# Patient Record
Sex: FEMALE | Race: WHITE | NOT HISPANIC OR LATINO
[De-identification: names, ages, dates, MRNs, and addresses within clinical notes are randomized per-mention and may not be internally consistent; named-entity substitution may affect disease eponyms.]

---

## 2018-02-15 ENCOUNTER — APPOINTMENT (OUTPATIENT)
Dept: SURGERY | Facility: CLINIC | Age: 78
End: 2018-02-15
Payer: MEDICARE

## 2018-02-15 PROCEDURE — 99213 OFFICE O/P EST LOW 20 MIN: CPT

## 2018-08-23 ENCOUNTER — APPOINTMENT (OUTPATIENT)
Dept: SURGERY | Facility: CLINIC | Age: 78
End: 2018-08-23

## 2019-06-17 ENCOUNTER — OUTPATIENT (OUTPATIENT)
Dept: OUTPATIENT SERVICES | Facility: HOSPITAL | Age: 79
LOS: 1 days | End: 2019-06-17
Payer: MEDICARE

## 2019-06-17 ENCOUNTER — APPOINTMENT (OUTPATIENT)
Dept: ULTRASOUND IMAGING | Facility: HOSPITAL | Age: 79
End: 2019-06-17
Payer: MEDICARE

## 2019-06-17 DIAGNOSIS — K08.409 PARTIAL LOSS OF TEETH, UNSPECIFIED CAUSE, UNSPECIFIED CLASS: Chronic | ICD-10-CM

## 2019-06-17 DIAGNOSIS — C73 MALIGNANT NEOPLASM OF THYROID GLAND: ICD-10-CM

## 2019-06-17 PROCEDURE — 76536 US EXAM OF HEAD AND NECK: CPT | Mod: 26

## 2019-06-17 PROCEDURE — 76536 US EXAM OF HEAD AND NECK: CPT

## 2021-05-22 ENCOUNTER — APPOINTMENT (OUTPATIENT)
Dept: RADIOLOGY | Facility: HOSPITAL | Age: 81
End: 2021-05-22

## 2021-06-05 ENCOUNTER — OUTPATIENT (OUTPATIENT)
Dept: OUTPATIENT SERVICES | Facility: HOSPITAL | Age: 81
LOS: 1 days | End: 2021-06-05
Payer: MEDICARE

## 2021-06-05 ENCOUNTER — APPOINTMENT (OUTPATIENT)
Dept: RADIOLOGY | Facility: HOSPITAL | Age: 81
End: 2021-06-05
Payer: MEDICARE

## 2021-06-05 DIAGNOSIS — K08.409 PARTIAL LOSS OF TEETH, UNSPECIFIED CAUSE, UNSPECIFIED CLASS: Chronic | ICD-10-CM

## 2021-06-05 DIAGNOSIS — Z00.8 ENCOUNTER FOR OTHER GENERAL EXAMINATION: ICD-10-CM

## 2021-06-05 PROCEDURE — 77080 DXA BONE DENSITY AXIAL: CPT | Mod: 26

## 2021-06-05 PROCEDURE — 77080 DXA BONE DENSITY AXIAL: CPT

## 2022-05-03 ENCOUNTER — APPOINTMENT (OUTPATIENT)
Dept: SURGERY | Facility: CLINIC | Age: 82
End: 2022-05-03
Payer: MEDICARE

## 2022-05-03 VITALS
SYSTOLIC BLOOD PRESSURE: 146 MMHG | DIASTOLIC BLOOD PRESSURE: 78 MMHG | BODY MASS INDEX: 24.48 KG/M2 | HEART RATE: 60 BPM | WEIGHT: 133 LBS | HEIGHT: 62 IN

## 2022-05-03 DIAGNOSIS — Z78.9 OTHER SPECIFIED HEALTH STATUS: ICD-10-CM

## 2022-05-03 PROCEDURE — 99204 OFFICE O/P NEW MOD 45 MIN: CPT

## 2022-05-06 PROBLEM — Z78.9 NO HISTORY OF ALCOHOL USE: Status: ACTIVE | Noted: 2022-05-06

## 2022-05-06 PROBLEM — Z78.9 NEVER SMOKED TOBACCO: Status: ACTIVE | Noted: 2022-05-06

## 2022-05-06 NOTE — CONSULT LETTER
[Dear  ___] : Dear  [unfilled], [Consult Letter:] : I had the pleasure of evaluating your patient, [unfilled]. [Please see my note below.] : Please see my note below. [Consult Closing:] : Thank you very much for allowing me to participate in the care of this patient.  If you have any questions, please do not hesitate to contact me. [Sincerely,] : Sincerely, [FreeTextEntry2] : Dr. Chuckie Blair, Dr. Jamison Horton [FreeTextEntry3] : Giovanny Martini MD, FACS\par System Director, Endocrine Surgery\par [facility]\par Associate  Professor of Surgery\par [facility] [employer] [facility]  [DrSaad  ___] : Dr. WHITAKER

## 2022-05-06 NOTE — PHYSICAL EXAM
[de-identified] : well healed scar [de-identified] : no palpable thyroid nodules [Nasal Endoscopy Performed] : nasal endoscopy was performed, see procedure section for findings [Laryngoscopy Performed] : laryngoscopy was performed, see procedure section for findings [Midline] : located in midline position [Normal] : orientation to person, place, and time: normal [de-identified] : fiberoptic laryngoscopy shows normal vocal cord mobility bilaterally with no lesions noted

## 2022-05-06 NOTE — HISTORY OF PRESENT ILLNESS
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 c/o nodule found on sonogram.  notes occasional hoarseness.   denies dysphagia, SOB or RT exposure\par sonogram:  left thyroid bed 7 mm nodule\par TSH 1.69,   TG 4\par I have reviewed all old and new data and available images.

## 2022-05-06 NOTE — ASSESSMENT
[FreeTextEntry1] : initial cancer was isthmus .  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  will observe.   no indication for any biopsies at this time.  repeat sonogram next visit. to return earlier if any change. patient has been given the opportunity to ask questions, and all of the patient's questions have been answered to their satisfaction\par

## 2022-05-06 NOTE — REASON FOR VISIT
[Initial Consultation] : an initial consultation for [FreeTextEntry2] : Thyroid malignancy [Other: _____] : [unfilled]

## 2022-11-03 ENCOUNTER — APPOINTMENT (OUTPATIENT)
Dept: SURGERY | Facility: CLINIC | Age: 82
End: 2022-11-03

## 2023-01-04 ENCOUNTER — NON-APPOINTMENT (OUTPATIENT)
Age: 83
End: 2023-01-04

## 2023-01-27 ENCOUNTER — NON-APPOINTMENT (OUTPATIENT)
Age: 83
End: 2023-01-27

## 2023-10-17 ENCOUNTER — APPOINTMENT (OUTPATIENT)
Dept: OTOLARYNGOLOGY | Facility: CLINIC | Age: 83
End: 2023-10-17
Payer: MEDICARE

## 2023-10-17 VITALS
HEART RATE: 70 BPM | BODY MASS INDEX: 24.11 KG/M2 | TEMPERATURE: 98 F | DIASTOLIC BLOOD PRESSURE: 70 MMHG | HEIGHT: 62 IN | WEIGHT: 131 LBS | SYSTOLIC BLOOD PRESSURE: 133 MMHG

## 2023-10-17 DIAGNOSIS — J34.2 DEVIATED NASAL SEPTUM: ICD-10-CM

## 2023-10-17 DIAGNOSIS — J34.3 HYPERTROPHY OF NASAL TURBINATES: ICD-10-CM

## 2023-10-17 DIAGNOSIS — H93.8X3 OTHER SPECIFIED DISORDERS OF EAR, BILATERAL: ICD-10-CM

## 2023-10-17 DIAGNOSIS — H61.21 IMPACTED CERUMEN, RIGHT EAR: ICD-10-CM

## 2023-10-17 DIAGNOSIS — J30.0 VASOMOTOR RHINITIS: ICD-10-CM

## 2023-10-17 PROCEDURE — 92557 COMPREHENSIVE HEARING TEST: CPT

## 2023-10-17 PROCEDURE — 92567 TYMPANOMETRY: CPT

## 2023-10-17 PROCEDURE — 99203 OFFICE O/P NEW LOW 30 MIN: CPT | Mod: 25

## 2023-10-19 ENCOUNTER — OUTPATIENT (OUTPATIENT)
Dept: OUTPATIENT SERVICES | Facility: HOSPITAL | Age: 83
LOS: 1 days | End: 2023-10-19
Payer: MEDICARE

## 2023-10-19 ENCOUNTER — APPOINTMENT (OUTPATIENT)
Dept: ULTRASOUND IMAGING | Facility: HOSPITAL | Age: 83
End: 2023-10-19
Payer: MEDICARE

## 2023-10-19 DIAGNOSIS — R11.0 NAUSEA: ICD-10-CM

## 2023-10-19 DIAGNOSIS — K21.9 GASTRO-ESOPHAGEAL REFLUX DISEASE WITHOUT ESOPHAGITIS: ICD-10-CM

## 2023-10-19 DIAGNOSIS — K08.409 PARTIAL LOSS OF TEETH, UNSPECIFIED CAUSE, UNSPECIFIED CLASS: Chronic | ICD-10-CM

## 2023-10-19 PROCEDURE — 76700 US EXAM ABDOM COMPLETE: CPT | Mod: 26

## 2023-10-19 PROCEDURE — 76700 US EXAM ABDOM COMPLETE: CPT

## 2023-10-26 ENCOUNTER — APPOINTMENT (OUTPATIENT)
Dept: ENDOCRINOLOGY | Facility: CLINIC | Age: 83
End: 2023-10-26
Payer: MEDICARE

## 2023-10-26 VITALS
OXYGEN SATURATION: 97 % | BODY MASS INDEX: 24.11 KG/M2 | SYSTOLIC BLOOD PRESSURE: 132 MMHG | RESPIRATION RATE: 17 BRPM | HEART RATE: 73 BPM | WEIGHT: 131 LBS | HEIGHT: 62 IN | DIASTOLIC BLOOD PRESSURE: 74 MMHG | TEMPERATURE: 97.9 F

## 2023-10-26 DIAGNOSIS — M85.80 OTHER SPECIFIED DISORDERS OF BONE DENSITY AND STRUCTURE, UNSPECIFIED SITE: ICD-10-CM

## 2023-10-26 PROCEDURE — 99205 OFFICE O/P NEW HI 60 MIN: CPT

## 2023-11-13 ENCOUNTER — OUTPATIENT (OUTPATIENT)
Dept: OUTPATIENT SERVICES | Facility: HOSPITAL | Age: 83
LOS: 1 days | End: 2023-11-13
Payer: MEDICARE

## 2023-11-13 ENCOUNTER — APPOINTMENT (OUTPATIENT)
Dept: RADIOLOGY | Facility: HOSPITAL | Age: 83
End: 2023-11-13
Payer: MEDICARE

## 2023-11-13 DIAGNOSIS — K08.409 PARTIAL LOSS OF TEETH, UNSPECIFIED CAUSE, UNSPECIFIED CLASS: Chronic | ICD-10-CM

## 2023-11-13 DIAGNOSIS — M85.80 OTHER SPECIFIED DISORDERS OF BONE DENSITY AND STRUCTURE, UNSPECIFIED SITE: ICD-10-CM

## 2023-11-13 PROCEDURE — 77085 DXA BONE DENSITY AXL VRT FX: CPT

## 2023-11-13 PROCEDURE — 77085 DXA BONE DENSITY AXL VRT FX: CPT | Mod: 26

## 2023-11-14 LAB
25(OH)D3 SERPL-MCNC: 59.1 NG/ML
ALBUMIN SERPL ELPH-MCNC: 4.1 G/DL
ALP BLD-CCNC: 105 U/L
ALT SERPL-CCNC: 7 U/L
ANION GAP SERPL CALC-SCNC: 10 MMOL/L
AST SERPL-CCNC: 15 U/L
BILIRUB SERPL-MCNC: 0.3 MG/DL
BUN SERPL-MCNC: 24 MG/DL
CALCIUM SERPL-MCNC: 8.7 MG/DL
CALCIUM SERPL-MCNC: 8.7 MG/DL
CHLORIDE SERPL-SCNC: 103 MMOL/L
CO2 SERPL-SCNC: 26 MMOL/L
CREAT SERPL-MCNC: 0.91 MG/DL
EGFR: 62 ML/MIN/1.73M2
GLUCOSE SERPL-MCNC: 104 MG/DL
PARATHYROID HORMONE INTACT: 54 PG/ML
POTASSIUM SERPL-SCNC: 3.9 MMOL/L
PROT SERPL-MCNC: 6.8 G/DL
SODIUM SERPL-SCNC: 139 MMOL/L
TSH SERPL-ACNC: 0.63 UIU/ML

## 2023-11-27 LAB
THYROGLOB AB SERPL-ACNC: 50 IU/ML
THYROGLOB SERPL-MCNC: <2 NG/ML
THYROGLOB SERPL-MCNC: NORMAL NG/ML

## 2023-12-20 ENCOUNTER — APPOINTMENT (OUTPATIENT)
Dept: ENDOCRINOLOGY | Facility: CLINIC | Age: 83
End: 2023-12-20
Payer: MEDICARE

## 2023-12-20 VITALS
TEMPERATURE: 97.6 F | OXYGEN SATURATION: 99 % | HEIGHT: 62 IN | BODY MASS INDEX: 23.92 KG/M2 | DIASTOLIC BLOOD PRESSURE: 82 MMHG | WEIGHT: 130 LBS | RESPIRATION RATE: 16 BRPM | SYSTOLIC BLOOD PRESSURE: 128 MMHG | HEART RATE: 76 BPM

## 2023-12-20 PROCEDURE — 99215 OFFICE O/P EST HI 40 MIN: CPT

## 2023-12-20 NOTE — ASSESSMENT
[FreeTextEntry1] : Target TSH 0.1 to 0.5  This patient has Stage 1 papillary thyroid cancer - in 2015 she was a high risk of recurrence as the margins were very close to the circumferential margin however, her risk is now low to intermediate as she did not have a recurrence since 2015. She did not have radioactive iodine ablation post thyroidectomy.  I ordered a thyroid US and thyroglobulin panel.  She has a subcentimeter left thyroid bed lesion that has been stable since 2021 and her thyroglobulin antibodies vary from normal to elevated - THERE IS NO EVIDENCE THAT SHE HAS A RECURRENCE OF HER THYROID CANCER - her intermittently elevated thyroglobulin antibodies may be due to her Hashimoto's thyroiditis. TPO antibodies were also positive due to her Hashimoto's thyroiditis.  The patient has osteoporosis - she appears to have adequate intake of calcium from her diet and multivitamin.   Last TSH was mildly above goal but I will accept this level  Last TSH 11/13/2023 - 0.63 N Last 25 OH vitamin D - November 2023 > 30  Plan: 1. Continue levothyroxine 2. Labs to be done in 3 months - see below 3. Thyroid US - patient has an appointment for this in Jan 2024 4. Follow up in 3 months to review results.

## 2023-12-20 NOTE — PHYSICAL EXAM
[de-identified] : General: No distress, well nourished Eyes: Normal Sclera, EOMI, PERRL ENT: Normal appearance of the nose, normal oropharynx Neck/Thyroid: No cervical lymphadenopathy, no neck masses palpated Respiratory: No use of accessory muscles of respiration, vesicular breath sounds heard bilaterally, no crepitations or ronchi Cardiovascular: S1 and S2 heard and normal, no S3 or S4, no murmurs, radial pulse normal bilaterally Abdomen: soft, non-tender, no masses, normal bowel sounds Musculoskeletal: No swelling or deformities of joints of hands, no pedal edema Neurological: Normal range of motion in the hands, Normal brachioradialis reflexes bilaterally Psychiatry: Patient converses normally, good judgement and insight Skin: No rashes in hands, no nodules palpated in hands

## 2023-12-20 NOTE — HISTORY OF PRESENT ILLNESS
[FreeTextEntry1] : Problems: 1. Papillary thyroid carcinoma 2. Hypothyroidism 3. Hashimoto's thyroiditis 4. Osteoporosis  Papillary thyroid carcinoma/Hypothyroidism/Hashimoto's thyroiditis 1. Patient was diagnosed with papillary thyroid carcinoma in 2015 and she underwent total thyroidectomy with paratracheal lymph node dissection and became hypothyroid post operatively Patient was offered radioactive iodine therapy but patient did not have this done. 2. Surgical and Pathology report: Sept 2015 - Total thyroidectomy with paratracheal lymph node dissection - lymph nodes negative for metastatic carcinoma, chronic lymphocytic thyroiditis, papillary thyroid carcinoma (classical variant) in the isthmus with tumor size of 1.2 x 1 x 0.8 cm, margins very close to the circumferential margin (focally less than 0.1 mm), no capsule invasion, no lymphovascular invasion or extrathyroidal extension 3. Labs: June 2019 - TPO abs 36.6 (0-34.9), thyroglobulin < 0.2, Thyroglobulin abs 97.2 (0-40) 04/07/2022 - TSH 0.435 (low), thyroglobulin 4 N 06/28/2022 - TSH 0.26, thyroglobulin abs 39 N, Thyroglobulin < 0.2  11/13/2023 - TSH 0.63 N, Thyroglobulin <1, Antithyroglobulin antibodies 50 (>/=1) 4. Radiology: A. 2021 - US thyroid - there is a 0.7 x 0.4 x 0.5 cm left thyroid echogenicity B. 04/07/2023 - US neck - There is a 0.7 x 0.5 x 0.5 cm focus of echogenicity seen in the left thyroid bed.  5. Mother and two sister have hypothyroidism, no family history of thyroid cancer, no personal history of radiation therapy 6. Meds: Levothyroxine (generic) 75 micrograms po Monday to Saturday - fully complaint and patient advised on the appropriate use of levothyroxine.     Osteoporosis 1. Diagnosed in 2010s with osteopenia and diagnosed with osteoporosis in November 2023 2. Radiology: A. June 2021 - DXA scan - L1 to L4 BMD 1.224 with T score of positive 1.6, left femoral neck BMD 0.619 with T score of neg 2.1  B. 11/13/2023 - DXA scan - L1 to L4 - BMD 1.162 with T score positive 1, left femoral neck BMD of 0.566 with T score neg 2.5  3.  Labs: November 2023 - Cr N, Corrected calcium N, PTH N 4. Fractures - toe  5. Calcium and vitamin D intake Patient cannot tolerate calcium supplements. On multivitamins - calcium and vitamin D content unknown On vitamin D dose unknown Patient drinks 1/2 cup of almond milk per day, eats three slices of cheese per week, eats 24 OZ of yogurt per week, eats 24 OZ of ice-cream per week 6.  Meds: Patient used alendronate in the past for one month but she stopped this due to cough and GI side effects Patient was offered prolia in the past but she does not want to use this

## 2024-01-08 ENCOUNTER — OUTPATIENT (OUTPATIENT)
Dept: OUTPATIENT SERVICES | Facility: HOSPITAL | Age: 84
LOS: 1 days | End: 2024-01-08
Payer: MEDICARE

## 2024-01-08 ENCOUNTER — APPOINTMENT (OUTPATIENT)
Dept: ULTRASOUND IMAGING | Facility: HOSPITAL | Age: 84
End: 2024-01-08
Payer: MEDICARE

## 2024-01-08 DIAGNOSIS — C73 MALIGNANT NEOPLASM OF THYROID GLAND: ICD-10-CM

## 2024-01-08 DIAGNOSIS — K08.409 PARTIAL LOSS OF TEETH, UNSPECIFIED CAUSE, UNSPECIFIED CLASS: Chronic | ICD-10-CM

## 2024-01-08 PROCEDURE — 76536 US EXAM OF HEAD AND NECK: CPT

## 2024-01-08 PROCEDURE — 76536 US EXAM OF HEAD AND NECK: CPT | Mod: 26

## 2024-03-14 ENCOUNTER — NON-APPOINTMENT (OUTPATIENT)
Age: 84
End: 2024-03-14

## 2024-03-15 ENCOUNTER — OUTPATIENT (OUTPATIENT)
Dept: OUTPATIENT SERVICES | Facility: HOSPITAL | Age: 84
LOS: 1 days | End: 2024-03-15
Payer: MEDICARE

## 2024-03-15 ENCOUNTER — APPOINTMENT (OUTPATIENT)
Dept: RADIOLOGY | Facility: HOSPITAL | Age: 84
End: 2024-03-15

## 2024-03-15 DIAGNOSIS — M25.561 PAIN IN RIGHT KNEE: ICD-10-CM

## 2024-03-15 DIAGNOSIS — K08.409 PARTIAL LOSS OF TEETH, UNSPECIFIED CAUSE, UNSPECIFIED CLASS: Chronic | ICD-10-CM

## 2024-03-15 PROCEDURE — 73562 X-RAY EXAM OF KNEE 3: CPT | Mod: 26,RT

## 2024-03-15 PROCEDURE — 73562 X-RAY EXAM OF KNEE 3: CPT

## 2024-03-18 ENCOUNTER — APPOINTMENT (OUTPATIENT)
Dept: ORTHOPEDIC SURGERY | Facility: CLINIC | Age: 84
End: 2024-03-18
Payer: MEDICARE

## 2024-03-18 ENCOUNTER — APPOINTMENT (OUTPATIENT)
Dept: MRI IMAGING | Facility: HOSPITAL | Age: 84
End: 2024-03-18
Payer: MEDICARE

## 2024-03-18 ENCOUNTER — TRANSCRIPTION ENCOUNTER (OUTPATIENT)
Age: 84
End: 2024-03-18

## 2024-03-18 ENCOUNTER — OUTPATIENT (OUTPATIENT)
Dept: OUTPATIENT SERVICES | Facility: HOSPITAL | Age: 84
LOS: 1 days | End: 2024-03-18
Payer: MEDICARE

## 2024-03-18 VITALS — BODY MASS INDEX: 24.35 KG/M2 | WEIGHT: 129 LBS | HEIGHT: 61 IN

## 2024-03-18 DIAGNOSIS — M17.11 UNILATERAL PRIMARY OSTEOARTHRITIS, RIGHT KNEE: ICD-10-CM

## 2024-03-18 PROCEDURE — 73721 MRI JNT OF LWR EXTRE W/O DYE: CPT

## 2024-03-18 PROCEDURE — 99204 OFFICE O/P NEW MOD 45 MIN: CPT

## 2024-03-18 PROCEDURE — 73721 MRI JNT OF LWR EXTRE W/O DYE: CPT | Mod: 26,RT

## 2024-03-21 ENCOUNTER — APPOINTMENT (OUTPATIENT)
Dept: ORTHOPEDIC SURGERY | Facility: CLINIC | Age: 84
End: 2024-03-21
Payer: MEDICARE

## 2024-03-21 VITALS — BODY MASS INDEX: 24.35 KG/M2 | HEIGHT: 61 IN | WEIGHT: 129 LBS

## 2024-03-21 PROCEDURE — 99214 OFFICE O/P EST MOD 30 MIN: CPT

## 2024-03-25 ENCOUNTER — APPOINTMENT (OUTPATIENT)
Dept: ENDOCRINOLOGY | Facility: CLINIC | Age: 84
End: 2024-03-25

## 2024-04-11 ENCOUNTER — APPOINTMENT (OUTPATIENT)
Dept: ORTHOPEDIC SURGERY | Facility: CLINIC | Age: 84
End: 2024-04-11
Payer: MEDICARE

## 2024-04-11 VITALS — HEIGHT: 61 IN | WEIGHT: 129 LBS | BODY MASS INDEX: 24.35 KG/M2

## 2024-04-11 DIAGNOSIS — M17.11 UNILATERAL PRIMARY OSTEOARTHRITIS, RIGHT KNEE: ICD-10-CM

## 2024-04-11 DIAGNOSIS — S82.141A DISPLACED BICONDYLAR FRACTURE OF RIGHT TIBIA, INITIAL ENCOUNTER FOR CLOSED FRACTURE: ICD-10-CM

## 2024-04-11 PROCEDURE — 20610 DRAIN/INJ JOINT/BURSA W/O US: CPT | Mod: RT

## 2024-04-11 PROCEDURE — 99214 OFFICE O/P EST MOD 30 MIN: CPT | Mod: 25

## 2024-04-11 PROCEDURE — 73564 X-RAY EXAM KNEE 4 OR MORE: CPT | Mod: RT

## 2024-04-12 ENCOUNTER — APPOINTMENT (OUTPATIENT)
Dept: MRI IMAGING | Facility: CLINIC | Age: 84
End: 2024-04-12
Payer: MEDICARE

## 2024-04-12 ENCOUNTER — OUTPATIENT (OUTPATIENT)
Dept: OUTPATIENT SERVICES | Facility: HOSPITAL | Age: 84
LOS: 1 days | End: 2024-04-12
Payer: MEDICARE

## 2024-04-12 DIAGNOSIS — M17.11 UNILATERAL PRIMARY OSTEOARTHRITIS, RIGHT KNEE: ICD-10-CM

## 2024-04-12 DIAGNOSIS — K08.409 PARTIAL LOSS OF TEETH, UNSPECIFIED CAUSE, UNSPECIFIED CLASS: Chronic | ICD-10-CM

## 2024-04-12 PROCEDURE — 73721 MRI JNT OF LWR EXTRE W/O DYE: CPT | Mod: 26,RT

## 2024-04-12 PROCEDURE — 73721 MRI JNT OF LWR EXTRE W/O DYE: CPT

## 2024-04-17 ENCOUNTER — NON-APPOINTMENT (OUTPATIENT)
Age: 84
End: 2024-04-17

## 2024-06-17 ENCOUNTER — APPOINTMENT (OUTPATIENT)
Dept: ENDOCRINOLOGY | Facility: CLINIC | Age: 84
End: 2024-06-17
Payer: MEDICARE

## 2024-06-17 VITALS
SYSTOLIC BLOOD PRESSURE: 126 MMHG | WEIGHT: 128 LBS | TEMPERATURE: 97.3 F | DIASTOLIC BLOOD PRESSURE: 62 MMHG | OXYGEN SATURATION: 99 % | HEART RATE: 76 BPM | BODY MASS INDEX: 24.17 KG/M2 | RESPIRATION RATE: 18 BRPM | HEIGHT: 61 IN

## 2024-06-17 DIAGNOSIS — C73 MALIGNANT NEOPLASM OF THYROID GLAND: ICD-10-CM

## 2024-06-17 DIAGNOSIS — E06.3 AUTOIMMUNE THYROIDITIS: ICD-10-CM

## 2024-06-17 DIAGNOSIS — M81.0 AGE-RELATED OSTEOPOROSIS W/OUT CURRENT PATHOLOGICAL FRACTURE: ICD-10-CM

## 2024-06-17 DIAGNOSIS — E03.9 HYPOTHYROIDISM, UNSPECIFIED: ICD-10-CM

## 2024-06-17 PROCEDURE — G2211 COMPLEX E/M VISIT ADD ON: CPT

## 2024-06-17 PROCEDURE — 99215 OFFICE O/P EST HI 40 MIN: CPT

## 2024-06-17 RX ORDER — RISEDRONATE SODIUM 35 MG/1
35 TABLET, FILM COATED ORAL
Qty: 3 | Refills: 3 | Status: ACTIVE | COMMUNITY
Start: 2024-06-17 | End: 1900-01-01

## 2024-06-17 NOTE — ASSESSMENT
[FreeTextEntry1] : Target TSH 0.1 to 0.5  This patient has Stage 1 papillary thyroid cancer - in 2015 she was a high risk of recurrence as the margins were very close to the circumferential margin however, her risk is now low to intermediate as she did not have a recurrence since 2015. She did not have radioactive iodine ablation post thyroidectomy.   She had a subcentimeter left thyroid bed lesion that has been stable from 2021 to 2023 but thyroid US  and her thyroglobulin antibodies vary from normal to elevated - THERE IS NO EVIDENCE THAT SHE HAS A RECURRENCE OF HER THYROID CANCER - her intermittently elevated thyroglobulin antibodies may be due to her Hashimoto's thyroiditis. TPO antibodies were also positive due to her Hashimoto's thyroiditis.  The patient has osteoporosis - she appears to have adequate intake of calcium from her diet and multivitamin. Next DXA scan due in Dec 2025. The patient consented to treatment for her osteoporosis on 06/17/2024 but she only wanted to use an oral medication so I prescribed actonel on 06/17/2024.   Last TSH was mildly above goal but I will accept this level  Last TSH 11/13/2023 - 0.63 N Last 25 OH vitamin D - November 2023 > 30 Last Thyroglobulin level - November 2023 - Antithyroglobulin antibodies 50 (<1), thyroglobulin ANJELICA < 2 N Last thyroid US - Jan 2024 - no recurrence Last 25 OH vitamin D - November 2023 - 59.1  Plan: 1. Continue levothyroxine 2. Start actonel 35 mg po once weekly 3. Labs to be done today - see below 4. Follow up in 6 weeks to review results.

## 2024-06-17 NOTE — HISTORY OF PRESENT ILLNESS
[FreeTextEntry1] : Problems: 1. Papillary thyroid carcinoma 2. Hypothyroidism 3. Hashimoto's thyroiditis 4. Osteoporosis  Papillary thyroid carcinoma/Hypothyroidism/Hashimoto's thyroiditis 1. Patient was diagnosed with papillary thyroid carcinoma in 2015 and she underwent total thyroidectomy with paratracheal lymph node dissection and became hypothyroid post operatively Patient was offered radioactive iodine therapy but patient did not have this done. 2. Surgical and Pathology report: Sept 2015 - Total thyroidectomy with paratracheal lymph node dissection - lymph nodes negative for metastatic carcinoma, chronic lymphocytic thyroiditis, papillary thyroid carcinoma (classical variant) in the isthmus with tumor size of 1.2 x 1 x 0.8 cm, margins very close to the circumferential margin (focally less than 0.1 mm), no capsule invasion, no lymphovascular invasion or extrathyroidal extension 3. Labs: June 2019 - TPO abs 36.6 (0-34.9), thyroglobulin < 0.2, Thyroglobulin abs 97.2 (0-40) 04/07/2022 - TSH 0.435 (low), thyroglobulin 4 N 06/28/2022 - TSH 0.26, thyroglobulin abs 39 N, Thyroglobulin < 0.2  11/13/2023 - TSH 0.63 N, Thyroglobulin <1, Antithyroglobulin antibodies 50 (>/=1) 4. Radiology: A. 2021 - US thyroid - there is a 0.7 x 0.4 x 0.5 cm left thyroid echogenicity B. 04/07/2023 - US neck - There is a 0.7 x 0.5 x 0.5 cm focus of echogenicity seen in the left thyroid bed.  C. Jan 2024 - US thyroid - Status post total thyroidectomy. No discrete solid or cystic lesion is noted within the thyroid surgical bed,  No enlarged or abnormal morphology cervical nodes. 5. Mother and two sister have hypothyroidism, no family history of thyroid cancer, no personal history of radiation therapy 6. Meds: Levothyroxine (generic) 75 micrograms po Monday to Saturday - fully complaint and patient advised on the appropriate use of levothyroxine.     Osteoporosis 1. Diagnosed in 2010s with osteopenia and diagnosed with osteoporosis in November 2023 2. Radiology: A. June 2021 - DXA scan - L1 to L4 BMD 1.224 with T score of positive 1.6, left femoral neck BMD 0.619 with T score of neg 2.1  B. 11/13/2023 - DXA scan - L1 to L4 - BMD 1.162 with T score positive 1, left femoral neck BMD of 0.566 with T score neg 2.5  3.  Labs: November 2023 - Cr N, Corrected calcium N, PTH N 4. Fractures - toe, right tibia in March 2024 when she tripped while walking  5. Calcium and vitamin D intake Patient cannot tolerate calcium supplements. On multivitamins which she uses intermittently - calcium and vitamin D content unknown On vitamin D dose 5000 units po daily Patient drinks 1/2 cup of almond milk per day, eats three slices of cheese per week, eats 24 OZ of yogurt per week, eats 24 OZ of ice-cream per week 6.  Meds: Patient used alendronate in the past for one month but she stopped this due to cough and GI side effects Patient was offered prolia in the past but she does not want to use this

## 2024-06-17 NOTE — PHYSICAL EXAM
[de-identified] : General: No distress, well nourished Eyes: Normal Sclera, EOMI, PERRL ENT: Normal appearance of the nose, normal oropharynx Neck/Thyroid: No cervical lymphadenopathy, no neck masses palpated Respiratory: No use of accessory muscles of respiration, vesicular breath sounds heard bilaterally, no crepitations or ronchi Cardiovascular: S1 and S2 heard and normal, no S3 or S4, no murmurs, radial pulse normal bilaterally Abdomen: soft, non-tender, no masses, normal bowel sounds Musculoskeletal: No swelling or deformities of joints of hands, no pedal edema Neurological: Normal range of motion in the hands, Normal brachioradialis reflexes bilaterally Psychiatry: Patient converses normally, good judgement and insight Skin: No rashes in hands, no nodules palpated in hands

## 2024-06-18 LAB
25(OH)D3 SERPL-MCNC: 74.3 NG/ML
ALBUMIN SERPL ELPH-MCNC: 4.3 G/DL
ALP BLD-CCNC: 95 U/L
ALT SERPL-CCNC: 5 U/L
ANION GAP SERPL CALC-SCNC: 16 MMOL/L
AST SERPL-CCNC: 19 U/L
BILIRUB SERPL-MCNC: 0.4 MG/DL
BUN SERPL-MCNC: 21 MG/DL
CALCIUM SERPL-MCNC: 9.5 MG/DL
CHLORIDE SERPL-SCNC: 99 MMOL/L
CO2 SERPL-SCNC: 23 MMOL/L
CREAT SERPL-MCNC: 0.94 MG/DL
EGFR: 60 ML/MIN/1.73M2
GLUCOSE SERPL-MCNC: 67 MG/DL
POTASSIUM SERPL-SCNC: 4.3 MMOL/L
PROT SERPL-MCNC: 7.8 G/DL
SODIUM SERPL-SCNC: 137 MMOL/L
THYROGLOB AB SERPL-ACNC: 25.4 IU/ML
THYROGLOB SERPL-MCNC: <0.2 NG/ML
TSH SERPL-ACNC: 0.78 UIU/ML

## 2024-06-23 LAB
CLINICAL BIOCHEMIST REVIEW: NORMAL
THYROGLOB SERPL-MCNC: <0.2 NG/ML

## 2024-07-11 ENCOUNTER — APPOINTMENT (OUTPATIENT)
Dept: ORTHOPEDIC SURGERY | Facility: CLINIC | Age: 84
End: 2024-07-11
Payer: MEDICARE

## 2024-07-11 VITALS — BODY MASS INDEX: 24.17 KG/M2 | WEIGHT: 128 LBS | HEIGHT: 61 IN

## 2024-07-11 DIAGNOSIS — S82.141D DISPLACED BICONDYLAR FRACTURE OF RIGHT TIBIA, SUBSEQUENT ENCOUNTER FOR CLOSED FRACTURE WITH ROUTINE HEALING: ICD-10-CM

## 2024-07-11 PROCEDURE — 99214 OFFICE O/P EST MOD 30 MIN: CPT

## 2024-07-11 PROCEDURE — 73564 X-RAY EXAM KNEE 4 OR MORE: CPT | Mod: 50

## 2024-07-22 ENCOUNTER — APPOINTMENT (OUTPATIENT)
Dept: ORTHOPEDIC SURGERY | Facility: CLINIC | Age: 84
End: 2024-07-22
Payer: MEDICARE

## 2024-07-22 VITALS — WEIGHT: 128 LBS | HEIGHT: 61 IN | BODY MASS INDEX: 24.17 KG/M2

## 2024-07-22 DIAGNOSIS — M17.11 UNILATERAL PRIMARY OSTEOARTHRITIS, RIGHT KNEE: ICD-10-CM

## 2024-07-22 DIAGNOSIS — M17.12 UNILATERAL PRIMARY OSTEOARTHRITIS, LEFT KNEE: ICD-10-CM

## 2024-07-22 PROCEDURE — 20610 DRAIN/INJ JOINT/BURSA W/O US: CPT | Mod: 50

## 2024-07-22 NOTE — PHYSICAL EXAM
[Wheelchair] : uses a wheelchair [Normal RLE] : Right Lower Extremity: No scars, rashes, lesions, ulcers, skin intact [Normal LLE] : Left Lower Extremity: No scars, rashes, lesions, ulcers, skin intact [Normal Touch] : sensation intact for touch [Normal] : Alert and in no acute distress

## 2024-07-26 ENCOUNTER — APPOINTMENT (OUTPATIENT)
Dept: OTOLARYNGOLOGY | Facility: CLINIC | Age: 84
End: 2024-07-26
Payer: MEDICARE

## 2024-07-26 VITALS
DIASTOLIC BLOOD PRESSURE: 75 MMHG | SYSTOLIC BLOOD PRESSURE: 132 MMHG | WEIGHT: 128 LBS | HEIGHT: 61 IN | BODY MASS INDEX: 24.17 KG/M2 | HEART RATE: 74 BPM

## 2024-07-26 PROCEDURE — 92567 TYMPANOMETRY: CPT

## 2024-07-26 PROCEDURE — 92557 COMPREHENSIVE HEARING TEST: CPT

## 2024-07-26 PROCEDURE — 99213 OFFICE O/P EST LOW 20 MIN: CPT

## 2024-07-26 RX ORDER — PREDNISONE 10 MG/1
10 TABLET ORAL
Qty: 95 | Refills: 0 | Status: ACTIVE | COMMUNITY
Start: 2024-07-26 | End: 1900-01-01

## 2024-07-26 NOTE — END OF VISIT
[FreeTextEntry3] : I personally saw and examined PRITI WU in detail.  I spoke to TARA Tobar regarding the assessment and plan of care. I performed the procedures and relevant physical exam.  I have reviewed the above assessment and plan of care and I agree.  I have made changes to the body of the note wherever necessary and appropriate.

## 2024-07-26 NOTE — DATA REVIEWED
[de-identified] : 10/18/23: b/l high freq SNHL  07/26/24 10 dB asymmetry in all frequencies in the left ear

## 2024-07-26 NOTE — PHYSICAL EXAM
[] : septum deviated to the left [Midline] : trachea located in midline position [Normal] : inferior turbinates and middle turbinates are normal

## 2024-07-26 NOTE — HISTORY OF PRESENT ILLNESS
[de-identified] : Ms. WU is a 83 year female with c/o pressure and clogging day after second covid vaccine more than 2 years ago which comes and goes, also with b/l tinnitus which comes and goes - denies sig ear history - has seen an ENT years ago [FreeTextEntry1] : Last seen 10/2023 Believes she had water in her left ear 10 days ago that is causing a clogged sensation that she cannot "pop" This is causing decreased hearing in left side Has had tinnitus for years Denies otalgia, otorrhea, dizziness, headaches related to hearing

## 2024-07-26 NOTE — ASSESSMENT
[FreeTextEntry1] : SSNHL left -Only 10 dB asymmetry, 10 days out -Discussed IT steroid injection versus p.o. steroids.  No history of diabetes, will start with p.o. steroids and see how she tolerates - Follow-up next week for repeat audio - MRI if asymmetry does not resolve -IT injection if does not tolerate p.o. steroids

## 2024-07-26 NOTE — REASON FOR VISIT
[Subsequent Evaluation] : a subsequent evaluation for [Tinnitus] : tinnitus [FreeTextEntry2] : ear clogging / pressure / rhinitis

## 2024-07-29 ENCOUNTER — APPOINTMENT (OUTPATIENT)
Dept: ENDOCRINOLOGY | Facility: CLINIC | Age: 84
End: 2024-07-29
Payer: MEDICARE

## 2024-07-29 VITALS
HEIGHT: 61 IN | WEIGHT: 130 LBS | BODY MASS INDEX: 24.55 KG/M2 | DIASTOLIC BLOOD PRESSURE: 78 MMHG | SYSTOLIC BLOOD PRESSURE: 118 MMHG | HEART RATE: 75 BPM | OXYGEN SATURATION: 98 % | RESPIRATION RATE: 18 BRPM | TEMPERATURE: 97.3 F

## 2024-07-29 DIAGNOSIS — E06.3 AUTOIMMUNE THYROIDITIS: ICD-10-CM

## 2024-07-29 DIAGNOSIS — C73 MALIGNANT NEOPLASM OF THYROID GLAND: ICD-10-CM

## 2024-07-29 DIAGNOSIS — M81.0 AGE-RELATED OSTEOPOROSIS W/OUT CURRENT PATHOLOGICAL FRACTURE: ICD-10-CM

## 2024-07-29 DIAGNOSIS — E03.9 HYPOTHYROIDISM, UNSPECIFIED: ICD-10-CM

## 2024-07-29 PROCEDURE — 99215 OFFICE O/P EST HI 40 MIN: CPT

## 2024-07-29 NOTE — ASSESSMENT
[FreeTextEntry1] : Target TSH 0.1 to 0.5  This patient has Stage 1 papillary thyroid cancer - in 2015 she was a high risk of recurrence as the margins were very close to the circumferential margin however, her risk is now low to intermediate as she did not have a recurrence since 2015. She did not have radioactive iodine ablation post thyroidectomy.   She had a subcentimeter left thyroid bed lesion that has been stable from 2021 to 2023 but thyroid US and her thyroglobulin antibodies vary from normal to elevated - THERE IS NO EVIDENCE THAT SHE HAS A RECURRENCE OF HER THYROID CANCER - her intermittently elevated thyroglobulin antibodies may be due to her Hashimoto's thyroiditis. TPO antibodies were also positive due to her Hashimoto's thyroiditis.  The patient has osteoporosis - she appears to have adequate intake of calcium from her diet and multivitamin. Next DXA scan due in Dec 2025. The patient does not want to use treatment for her osteoporosis - she is to perform weight bearing exercises to improve her BMD. HER 25 OH VITAMIN D WAS IN TH 70S IN JUNE 2024 SO I DECREASED THE DOSE OF VITAMIN D  Last TSH was mildly above goal but I will accept this level  Last TSH - June - 0.78 N Last Thyroglobulin level - June 2024 - TSH 0.78 N, thyroglobulin by mass spectrometry - <0.2, thyroglobulin < 0.2, thyroglobulin antibodies 25,4 (</=40), DUE NEXT IN FEB 2025 Last thyroid US - Jan 2024 - no recurrence DUE NEXT IN FEB 2025 Last 25 OH vitamin D - June 2024 - 74.3  Plan: 1. Continue levothyroxine 2. Decrease vitamin D to 1000 units po daily 3. Labs to be done in Feb 2025 - see below 4. Thyroid US to be done in Feb 2025 5. Follow up in Feb 2025 to review results.

## 2024-07-29 NOTE — PHYSICAL EXAM
[de-identified] : General: No distress, well nourished Eyes: Normal Sclera, EOMI, PERRL ENT: Normal appearance of the nose, normal oropharynx Neck/Thyroid: No cervical lymphadenopathy, no neck masses palpated Respiratory: No use of accessory muscles of respiration, vesicular breath sounds heard bilaterally, no crepitations or ronchi Cardiovascular: S1 and S2 heard and normal, no S3 or S4, no murmurs, radial pulse normal bilaterally Abdomen: soft, non-tender, no masses, normal bowel sounds Musculoskeletal: No swelling or deformities of joints of hands, no pedal edema Neurological: Normal range of motion in the hands, Normal brachioradialis reflexes bilaterally Psychiatry: Patient converses normally, good judgement and insight Skin: No rashes in hands, no nodules palpated in hands

## 2024-07-29 NOTE — HISTORY OF PRESENT ILLNESS
[FreeTextEntry1] : Problems: 1. Papillary thyroid carcinoma 2. Hypothyroidism 3. Hashimoto's thyroiditis 4. Osteoporosis  Papillary thyroid carcinoma/Hypothyroidism/Hashimoto's thyroiditis 1. Patient was diagnosed with papillary thyroid carcinoma in 2015 and she underwent total thyroidectomy with paratracheal lymph node dissection and became hypothyroid post operatively Patient was offered radioactive iodine therapy but patient did not have this done. 2. Surgical and Pathology report: Sept 2015 - Total thyroidectomy with paratracheal lymph node dissection - lymph nodes negative for metastatic carcinoma, chronic lymphocytic thyroiditis, papillary thyroid carcinoma (classical variant) in the isthmus with tumor size of 1.2 x 1 x 0.8 cm, margins very close to the circumferential margin (focally less than 0.1 mm), no capsule invasion, no lymphovascular invasion or extrathyroidal extension 3. Labs: June 2019 - TPO abs 36.6 (0-34.9), thyroglobulin < 0.2, Thyroglobulin abs 97.2 (0-40) 04/07/2022 - TSH 0.435 (low), thyroglobulin 4 N 06/28/2022 - TSH 0.26, thyroglobulin abs 39 N, Thyroglobulin < 0.2  11/13/2023 - TSH 0.63 N, Thyroglobulin <1, Antithyroglobulin antibodies 50 (>/=1) June 2024 - TSH 0.78 N, thyroglobulin by mass spectrometry - <0.2, thyroglobulin < 0.2, thyroglobulin antibodies 25,4 (</=40) 4. Radiology: A. 2021 - US thyroid - there is a 0.7 x 0.4 x 0.5 cm left thyroid echogenicity B. 04/07/2023 - US neck - There is a 0.7 x 0.5 x 0.5 cm focus of echogenicity seen in the left thyroid bed.  C. Jan 2024 - US thyroid - Status post total thyroidectomy. No discrete solid or cystic lesion is noted within the thyroid surgical bed,  No enlarged or abnormal morphology cervical nodes. 5. Mother and two sister have hypothyroidism, no family history of thyroid cancer, no personal history of radiation therapy 6. Meds: Levothyroxine (generic) 75 micrograms po Monday to Saturday - fully complaint and patient advised on the appropriate use of levothyroxine.     Osteoporosis 1. Diagnosed in 2010s with osteopenia and diagnosed with osteoporosis in November 2023 2. Radiology: A. June 2021 - DXA scan - L1 to L4 BMD 1.224 with T score of positive 1.6, left femoral neck BMD 0.619 with T score of neg 2.1  B. 11/13/2023 - DXA scan - L1 to L4 - BMD 1.162 with T score positive 1, left femoral neck BMD of 0.566 with T score neg 2.5  3.  Labs: November 2023 - Cr N, Corrected calcium N, PTH N 4. Fractures - toe, right tibia in March 2024 when she tripped while walking  5. Calcium and vitamin D intake Patient cannot tolerate calcium supplements. On multivitamins which she uses intermittently - calcium and vitamin D content unknown On vitamin D  5000 units po every other day - I DECREASED THIS TO 1000 UNITS PO DAILY ON 07/29/2024 Patient drinks 1/2 cup of almond milk per day, eats three slices of cheese per week, eats 24 OZ of yogurt per week, eats 24 OZ of ice-cream per week 6.  Meds: Patient used alendronate in the past for one month but she stopped this due to cough and GI side effects. Patient was offered prolia in the past but she does not want to use this I prescribed actonel 35 mg po once weekly in June 2024 but PATIENT DID NOT START THIS AS SHE WAS FEARFUL OF THE SIDE EFFECTS.

## 2024-07-31 ENCOUNTER — APPOINTMENT (OUTPATIENT)
Dept: OTOLARYNGOLOGY | Facility: CLINIC | Age: 84
End: 2024-07-31
Payer: MEDICARE

## 2024-07-31 ENCOUNTER — NON-APPOINTMENT (OUTPATIENT)
Age: 84
End: 2024-07-31

## 2024-07-31 VITALS — SYSTOLIC BLOOD PRESSURE: 164 MMHG | HEART RATE: 57 BPM | DIASTOLIC BLOOD PRESSURE: 79 MMHG | TEMPERATURE: 97.6 F

## 2024-07-31 DIAGNOSIS — H91.22 SUDDEN IDIOPATHIC HEARING LOSS, LEFT EAR: ICD-10-CM

## 2024-07-31 DIAGNOSIS — H93.8X3 OTHER SPECIFIED DISORDERS OF EAR, BILATERAL: ICD-10-CM

## 2024-07-31 DIAGNOSIS — R31.9 HEMATURIA, UNSPECIFIED: ICD-10-CM

## 2024-07-31 PROCEDURE — 92557 COMPREHENSIVE HEARING TEST: CPT

## 2024-07-31 PROCEDURE — 92567 TYMPANOMETRY: CPT

## 2024-07-31 PROCEDURE — 99213 OFFICE O/P EST LOW 20 MIN: CPT

## 2024-07-31 NOTE — ASSESSMENT
[FreeTextEntry1] : SSNHL left - repeat Audio is unchanged - pt feels improved - complete steroid taper - Follow-up next week for repeat audio - MRI if asymmetry does not resolve - declines salvage injection

## 2024-07-31 NOTE — DATA REVIEWED
[de-identified] : 10/18/23: b/l high freq SNHL  07/26/24 10 dB asymmetry in all frequencies in the left ear 07/31/24: unchanged

## 2024-07-31 NOTE — HISTORY OF PRESENT ILLNESS
[de-identified] : Ms. WU is a 83 year female with c/o pressure and clogging day after second covid vaccine more than 2 years ago which comes and goes, also with b/l tinnitus which comes and goes - denies sig ear history - has seen an ENT years ago [FreeTextEntry1] : pt here for f/u, she is on day 6 of high dose taper Prednisone, she is starting tolerate the medication better, feels clogged in R ear as well but feels she may be hearing better

## 2024-07-31 NOTE — END OF VISIT
[FreeTextEntry3] : I personally saw and examined PRITI WU  in detail. I spoke to TARA Tobar regarding the assessment and plan of care. I performed the procedures and relevant physical exam. I have made changes to the body of the note wherever necessary and appropriate.

## 2024-08-01 ENCOUNTER — NON-APPOINTMENT (OUTPATIENT)
Age: 84
End: 2024-08-01

## 2024-08-01 ENCOUNTER — LABORATORY RESULT (OUTPATIENT)
Age: 84
End: 2024-08-01

## 2024-08-05 ENCOUNTER — NON-APPOINTMENT (OUTPATIENT)
Age: 84
End: 2024-08-05

## 2024-08-05 LAB
ANION GAP SERPL CALC-SCNC: 14 MMOL/L
APPEARANCE: CLEAR
BILIRUBIN URINE: NEGATIVE
BLOOD URINE: ABNORMAL
BUN SERPL-MCNC: 21 MG/DL
CALCIUM SERPL-MCNC: 9.2 MG/DL
CHLORIDE SERPL-SCNC: 101 MMOL/L
CO2 SERPL-SCNC: 28 MMOL/L
COLOR: YELLOW
CREAT SERPL-MCNC: 1.04 MG/DL
EGFR: 53 ML/MIN/1.73M2
GLUCOSE QUALITATIVE U: NEGATIVE MG/DL
GLUCOSE SERPL-MCNC: 150 MG/DL
KETONES URINE: NEGATIVE MG/DL
LEUKOCYTE ESTERASE URINE: ABNORMAL
NITRITE URINE: NEGATIVE
PH URINE: 6.5
POTASSIUM SERPL-SCNC: 3.2 MMOL/L
PROTEIN URINE: NEGATIVE MG/DL
SODIUM SERPL-SCNC: 142 MMOL/L
SPECIFIC GRAVITY URINE: 1.01
UROBILINOGEN URINE: 0.2 MG/DL

## 2024-08-06 ENCOUNTER — APPOINTMENT (OUTPATIENT)
Dept: OTOLARYNGOLOGY | Facility: CLINIC | Age: 84
End: 2024-08-06

## 2024-08-06 PROCEDURE — 92557 COMPREHENSIVE HEARING TEST: CPT

## 2024-08-06 PROCEDURE — 99213 OFFICE O/P EST LOW 20 MIN: CPT

## 2024-08-06 PROCEDURE — 92567 TYMPANOMETRY: CPT

## 2024-08-06 NOTE — HISTORY OF PRESENT ILLNESS
[de-identified] : Ms. WU is a 83 year female with c/o pressure and clogging day after second covid vaccine more than 2 years ago which comes and goes, also with b/l tinnitus which comes and goes - s/p Prednisone 60 mg taper she completed 60 mg x 6 days, started with pedal edema, HTN and hematuria on day 6 which has since resolved [FreeTextEntry1] : feels hearing is almost back to baseline has not seen PCP, scheduled for Nephrology 9/11/24

## 2024-08-06 NOTE — HISTORY OF PRESENT ILLNESS
[de-identified] : Ms. WU is a 83 year female with c/o pressure and clogging day after second covid vaccine more than 2 years ago which comes and goes, also with b/l tinnitus which comes and goes - s/p Prednisone 60 mg taper she completed 60 mg x 6 days, started with pedal edema, HTN and hematuria on day 6 which has since resolved [FreeTextEntry1] : feels hearing is almost back to baseline has not seen PCP, scheduled for Nephrology 9/11/24

## 2024-08-06 NOTE — DATA REVIEWED
[de-identified] : 10/18/23: b/l high freq SNHL  07/26/24 10 dB asymmetry in all frequencies in the left ear 07/31/24: unchanged  08/06/24: symmetric

## 2024-08-06 NOTE — ASSESSMENT
[facility] Note:  Renal Dose Adjustment for enoxaparin (LOVENOX)    Carrie Munoz has been prescribed enoxaparin 40 mg subcutaneously every 24 hours. Estimated Creatinine Clearance: 24.6 mL/min (A) (based on SCr of 2.28 mg/dL (H)). Calculated CrCl 20 to 30 mL/min so the dose of Enoxaparin (LOVENOX) has been changed to enoxaparin 30 mg every 24 hours per P&T approved protocol. Pharmacy will continue to follow, and make additional adjustments if needed.       Thank you,  Grace Garibay, PharmD  4/1/2023 8:53 AM [FreeTextEntry1] : SSNHL left pt feels hearing has improved - f/u with PCP and Nephrology-patient noted that last week when she was finishing her high dose steroid she had hematuria and pedal edema.  However her BMP was relatively normal-potassium was slightly low but her creatinine was normal, urinalysis showed trace blood and trace leukocyte esterase. - urology referral placed as well-patient described multiple gross blood clots seen in her urine last week - repeat Audio has become symmetric-no further steroids recommended -In the event of another sudden hearing loss in the future-would progress straight to intratympanic steroids and no further oral steroids due to the above reaction - Follow-up 1 month for repeat audio -No MRI as there is no further asymmetry

## 2024-08-06 NOTE — ASSESSMENT
[FreeTextEntry1] : SSNHL left pt feels hearing has improved - f/u with PCP and Nephrology-patient noted that last week when she was finishing her high dose steroid she had hematuria and pedal edema.  However her BMP was relatively normal-potassium was slightly low but her creatinine was normal, urinalysis showed trace blood and trace leukocyte esterase. - urology referral placed as well-patient described multiple gross blood clots seen in her urine last week - repeat Audio has become symmetric-no further steroids recommended -In the event of another sudden hearing loss in the future-would progress straight to intratympanic steroids and no further oral steroids due to the above reaction - Follow-up 1 month for repeat audio -No MRI as there is no further asymmetry

## 2024-08-06 NOTE — DATA REVIEWED
[de-identified] : 10/18/23: b/l high freq SNHL  07/26/24 10 dB asymmetry in all frequencies in the left ear 07/31/24: unchanged  08/06/24: symmetric

## 2024-08-14 ENCOUNTER — APPOINTMENT (OUTPATIENT)
Dept: OTOLARYNGOLOGY | Facility: CLINIC | Age: 84
End: 2024-08-14
Payer: MEDICARE

## 2024-08-14 VITALS
HEART RATE: 72 BPM | TEMPERATURE: 98 F | SYSTOLIC BLOOD PRESSURE: 139 MMHG | WEIGHT: 137 LBS | DIASTOLIC BLOOD PRESSURE: 74 MMHG | BODY MASS INDEX: 25.89 KG/M2

## 2024-08-14 PROCEDURE — 92557 COMPREHENSIVE HEARING TEST: CPT

## 2024-08-14 PROCEDURE — 69801 INCISE INNER EAR: CPT | Mod: LT

## 2024-08-14 PROCEDURE — 99213 OFFICE O/P EST LOW 20 MIN: CPT | Mod: 25

## 2024-08-14 PROCEDURE — 92567 TYMPANOMETRY: CPT

## 2024-08-14 NOTE — ASSESSMENT
[FreeTextEntry1] : s/p tx for SSNHL left now with decreased hearing L side / clogging  - Audio shows slight decrease - IT injection performed today - f/up next week

## 2024-08-14 NOTE — PROCEDURE
[FreeTextEntry3] : After informed verbal consent is obtained, pt placed supine in procedure room.  Binocular microscopy used to visualize the left tympanic membrane.  Phenol used to provide topical anesthesia to the tympanic membrane.  25 gauge needle used to inject Depo Medrol 40mg/mL total dose 0.3  mL.  Patient tolerated the procedure well.   NDC #:7838-4946-01 [id number]

## 2024-08-14 NOTE — DATA REVIEWED
[de-identified] : 10/18/23: b/l high freq SNHL  07/26/24 10 dB asymmetry in all frequencies in the left ear 07/31/24: unchanged  08/06/24: symmetric 08/14/24: 10 dB asymmetry in 2 freq

## 2024-08-14 NOTE — HISTORY OF PRESENT ILLNESS
[de-identified] : Ms. WU is a 83 year female with c/o L ear pressure and clogging day after second covid vaccine more than 2 years ago which comes and goes, also with b/l tinnitus which comes and goes. audio shows SSNHL - s/p Prednisone 60 mg taper she completed 60 mg x 6 days, started with pedal edema, HTN and hematuria on day 6 which has since resolved feels hearing is almost back to baseline - repeat Audio has become symmetric-no further steroids recommended  has not seen PCP, scheduled for Nephrology 9/11/24 scheduled with Urology 10/23/24 [FreeTextEntry1] : pt here today, feels pressure in both ears is starting to build up again. she feels hearing is starting to decrease in L ear

## 2024-08-20 ENCOUNTER — APPOINTMENT (OUTPATIENT)
Dept: OTOLARYNGOLOGY | Facility: CLINIC | Age: 84
End: 2024-08-20
Payer: MEDICARE

## 2024-08-20 VITALS
BODY MASS INDEX: 25.86 KG/M2 | HEIGHT: 61 IN | DIASTOLIC BLOOD PRESSURE: 73 MMHG | HEART RATE: 67 BPM | SYSTOLIC BLOOD PRESSURE: 122 MMHG | WEIGHT: 137 LBS

## 2024-08-20 PROCEDURE — 99213 OFFICE O/P EST LOW 20 MIN: CPT

## 2024-08-20 PROCEDURE — 92557 COMPREHENSIVE HEARING TEST: CPT

## 2024-08-20 PROCEDURE — 92567 TYMPANOMETRY: CPT

## 2024-08-20 NOTE — HISTORY OF PRESENT ILLNESS
[de-identified] :  Ms. WU is a 83 year female with c/o L ear pressure and clogging day after second covid vaccine more than 2 years ago which comes and goes, also with b/l tinnitus which comes and goes. audio shows [facility]NHL - s/p Prednisone 60 mg taper she completed 60 mg x 6 days, started with pedal edema, HTN and hematuria on day 6 which has since resolved feels hearing is almost back to baseline - repeat Audio has become symmetric-no further steroids recommended has not seen PCP, scheduled for Nephrology 9/11/24 scheduled with Urology 10/23/24  - s/p TM injection with steroids on 8/14/24 [FreeTextEntry1] : pt reports intermittent improvement in hearing over the last week, feels clogging on L side comes and goes. states immediately after injection she felt hearing had improved, now comes and goes

## 2024-08-20 NOTE — DATA REVIEWED
[de-identified] : 10/18/23: b/l high freq SNHL  07/26/24 10 dB asymmetry in all frequencies in the left ear 07/31/24: unchanged  08/06/24: symmetric 08/14/24: 10 dB asymmetry in 2 freq 08/20/24: improvement in WRS large ECV

## 2024-08-20 NOTE — ASSESSMENT
[FreeTextEntry1] : s/p IT injection for SSNHL left 8/14/24   - Audio shows slight improvement in WRS, no improvement in PTA.  Still with persistent perf on tymps but not visible.  Pt feels slight improvement compared to last week and given persistent perf would like to wait another week to see if there will be continued improvement.  - f/up next week, consider repeat IT injection at that point if no further improvement.  Consider MRI

## 2024-08-27 ENCOUNTER — APPOINTMENT (OUTPATIENT)
Dept: OTOLARYNGOLOGY | Facility: CLINIC | Age: 84
End: 2024-08-27
Payer: MEDICARE

## 2024-08-27 VITALS — SYSTOLIC BLOOD PRESSURE: 132 MMHG | DIASTOLIC BLOOD PRESSURE: 75 MMHG | HEART RATE: 66 BPM | TEMPERATURE: 98 F

## 2024-08-27 DIAGNOSIS — H93.8X3 OTHER SPECIFIED DISORDERS OF EAR, BILATERAL: ICD-10-CM

## 2024-08-27 DIAGNOSIS — Z01.10 ENCOUNTER FOR EXAMINATION OF EARS AND HEARING W/OUT ABNORMAL FINDINGS: ICD-10-CM

## 2024-08-27 DIAGNOSIS — H91.22 SUDDEN IDIOPATHIC HEARING LOSS, LEFT EAR: ICD-10-CM

## 2024-08-27 PROCEDURE — 92567 TYMPANOMETRY: CPT

## 2024-08-27 PROCEDURE — 92557 COMPREHENSIVE HEARING TEST: CPT

## 2024-08-27 PROCEDURE — 99213 OFFICE O/P EST LOW 20 MIN: CPT

## 2024-08-27 NOTE — END OF VISIT
[FreeTextEntry3] :  I personally saw and examined PRITI WU in detail.  I spoke to SOPHIA FRANCOIS regarding the assessment and plan of care. I performed the procedures and relevant physical exam.   I have made changes to the body of the note wherever necessary and appropriate.

## 2024-08-27 NOTE — HISTORY OF PRESENT ILLNESS
[de-identified] :  Ms. WU is a 83 year female with c/o L ear pressure and clogging day after second covid vaccine more than 2 years ago which comes and goes, also with b/l tinnitus which comes and goes. audio shows SSNHL - s/p Prednisone 60 mg taper she completed 60 mg x 6 days, started with pedal edema, HTN and hematuria on day 6 which has since resolved feels hearing is almost back to baseline - repeat Audio has become symmetric-no further steroids recommended has not seen PCP, scheduled for Nephrology 9/11/24 scheduled with Urology 10/23/24  - s/p TM injection with steroids on 8/14/24 [FreeTextEntry1] : She is here for follow up. Here hearing in her left ear has improved but she still feels the clarity is not 100%. She also feels the pressure in has improved in her ears. The pressure sensation is now intermittent. She has no ear drainage. Her left ear was very dry and now it is better

## 2024-08-27 NOTE — ASSESSMENT
[FreeTextEntry1] : s/p IT injection for SSNHL left 8/14/24, her hearing has improved, and the pressure is better. She still feels the clarity is not 100%  - Repeat Audio shows continued improvement, persistent perf although poorly visualized on exam - given continued improvement will hold off on injection today  - f/up 2 weeks

## 2024-08-27 NOTE — DATA REVIEWED
[de-identified] : 10/18/23: b/l high freq SNHL  07/26/24 10 dB asymmetry in all frequencies in the left ear 07/31/24: unchanged  08/06/24: symmetric 08/14/24: 10 dB asymmetry in 2 freq 08/20/24: improvement in WRS large ECV 08/27/24: improvement, nearly symmetric, still large ECV

## 2024-09-10 ENCOUNTER — APPOINTMENT (OUTPATIENT)
Dept: OTOLARYNGOLOGY | Facility: CLINIC | Age: 84
End: 2024-09-10
Payer: MEDICARE

## 2024-09-10 VITALS — HEART RATE: 72 BPM | TEMPERATURE: 98 F | DIASTOLIC BLOOD PRESSURE: 72 MMHG | SYSTOLIC BLOOD PRESSURE: 138 MMHG

## 2024-09-10 DIAGNOSIS — H91.22 SUDDEN IDIOPATHIC HEARING LOSS, LEFT EAR: ICD-10-CM

## 2024-09-10 DIAGNOSIS — H93.8X3 OTHER SPECIFIED DISORDERS OF EAR, BILATERAL: ICD-10-CM

## 2024-09-10 PROCEDURE — 99213 OFFICE O/P EST LOW 20 MIN: CPT

## 2024-09-10 PROCEDURE — 92557 COMPREHENSIVE HEARING TEST: CPT

## 2024-09-10 PROCEDURE — 92567 TYMPANOMETRY: CPT

## 2024-09-10 NOTE — HISTORY OF PRESENT ILLNESS
[de-identified] :  Ms. WU is a 83 year female with c/o L ear pressure and clogging day after second covid vaccine more than 2 years ago which comes and goes, also with b/l tinnitus which comes and goes. audio shows SSNHL - s/p Prednisone 60 mg taper she completed 60 mg x 6 days, started with pedal edema, HTN and hematuria on day 6 which has since resolved feels hearing is almost back to baseline - repeat Audio has become symmetric-no further steroids recommended has not seen PCP, scheduled for Nephrology 9/11/24 scheduled with Urology 10/23/24  - s/p TM injection with steroids on 8/14/24 [FreeTextEntry1] : feels hearing is almost back to normal clogged sensation is resolved, maybe slightly less clear on the left

## 2024-09-10 NOTE — ASSESSMENT
[FreeTextEntry1] : s/p IT injection for SSNHL left 8/14/24, her hearing has improved, and the pressure is better. She still feels the clarity is not 100%  - Repeat Audio is stable, persistent perf although poorly visualized on exam - asymptomatic from perf, will give more time to close - f/up 3 months or sooner if there is worsening

## 2024-09-10 NOTE — DATA REVIEWED
[de-identified] : 10/18/23: b/l high freq SNHL  07/26/24 10 dB asymmetry in all frequencies in the left ear 07/31/24: unchanged  08/06/24: symmetric 08/14/24: 10 dB asymmetry in 2 freq 08/20/24: improvement in WRS large ECV 08/27/24: improvement, nearly symmetric, still large ECV 09/10/24: same

## 2024-09-11 ENCOUNTER — APPOINTMENT (OUTPATIENT)
Dept: NEPHROLOGY | Facility: CLINIC | Age: 84
End: 2024-09-11
Payer: MEDICARE

## 2024-09-11 VITALS
SYSTOLIC BLOOD PRESSURE: 146 MMHG | BODY MASS INDEX: 25.91 KG/M2 | DIASTOLIC BLOOD PRESSURE: 74 MMHG | HEART RATE: 69 BPM | TEMPERATURE: 97.7 F | HEIGHT: 60 IN | OXYGEN SATURATION: 98 % | WEIGHT: 132 LBS

## 2024-09-11 DIAGNOSIS — R31.9 HEMATURIA, UNSPECIFIED: ICD-10-CM

## 2024-09-11 DIAGNOSIS — N18.2 CHRONIC KIDNEY DISEASE, STAGE 2 (MILD): ICD-10-CM

## 2024-09-11 PROCEDURE — 99205 OFFICE O/P NEW HI 60 MIN: CPT

## 2024-09-11 PROCEDURE — G2211 COMPLEX E/M VISIT ADD ON: CPT

## 2024-09-11 RX ORDER — BIMATOPROST 0.1 MG/ML
SOLUTION/ DROPS OPHTHALMIC
Refills: 0 | Status: ACTIVE | COMMUNITY

## 2024-09-11 RX ORDER — DORZOLAMIDE HYDROCHLORIDE 20 MG/ML
2 SOLUTION OPHTHALMIC
Refills: 0 | Status: ACTIVE | COMMUNITY

## 2024-09-11 NOTE — PLAN
[TextEntry] : Cr is 1 in the setting of bleed. Will repeat today. Will also assess UA and U Pr/Cr.  Will also check renal US to eval for stones/cysts. If negative, has an appt with  in October.   Will call pt with results.

## 2024-09-11 NOTE — PHYSICAL EXAM
[General Appearance - Alert] : alert [General Appearance - In No Acute Distress] : in no acute distress [Sclera] : the sclera and conjunctiva were normal [PERRL With Normal Accommodation] : pupils were equal in size, round, and reactive to light [Extraocular Movements] : extraocular movements were intact [Outer Ear] : the ears and nose were normal in appearance [Oropharynx] : the oropharynx was normal [Neck Appearance] : the appearance of the neck was normal [Neck Cervical Mass (___cm)] : no neck mass was observed [Jugular Venous Distention Increased] : there was no jugular-venous distention [Thyroid Nodule] : there were no palpable thyroid nodules [Thyroid Diffuse Enlargement] : the thyroid was not enlarged [Auscultation Breath Sounds / Voice Sounds] : lungs were clear to auscultation bilaterally [Heart Rate And Rhythm] : heart rate was normal and rhythm regular [Heart Sounds] : normal S1 and S2 [Heart Sounds Gallop] : no gallops [Murmurs] : no murmurs [Heart Sounds Pericardial Friction Rub] : no pericardial rub [Full Pulse] : the pedal pulses are present [Edema] : there was no peripheral edema [Bowel Sounds] : normal bowel sounds [Abdomen Soft] : soft [Abdomen Tenderness] : non-tender [Abdomen Mass (___ Cm)] : no abdominal mass palpated [Abnormal Walk] : normal gait [Nail Clubbing] : no clubbing  or cyanosis of the fingernails [Musculoskeletal - Swelling] : no joint swelling seen [Motor Tone] : muscle strength and tone were normal [Skin Color & Pigmentation] : normal skin color and pigmentation [Skin Turgor] : normal skin turgor [Deep Tendon Reflexes (DTR)] : deep tendon reflexes were 2+ and symmetric [] : no rash [Sensation] : the sensory exam was normal to light touch and pinprick [No Focal Deficits] : no focal deficits [Oriented To Time, Place, And Person] : oriented to person, place, and time [Impaired Insight] : insight and judgment were intact [Affect] : the affect was normal

## 2024-09-11 NOTE — HISTORY OF PRESENT ILLNESS
[FreeTextEntry1] : 83F with thyroid cancer s/p resection here for evaluation of ankle edema.    She has recent L hearing loss secondary to a viral infection on prednisone here. While on prednisone she had swollen ankles, blurry vision, and severe gross hematuria. She stopped prednisone over two days. She did not go to the ED. She had bleeding from 12 in afternoon until 2am and it got progressively better. Her ankles swell intermittently. She was reffered to see me from ENT.  She took a steroid injection in the ear. She never had a kidney stone. She was told that she has a kidney cyst however she does not remember where. Lives with son and daughter in law.

## 2024-09-12 LAB
25(OH)D3 SERPL-MCNC: 61.3 NG/ML
ALBUMIN SERPL ELPH-MCNC: 4 G/DL
ANION GAP SERPL CALC-SCNC: 19 MMOL/L
BASOPHILS # BLD AUTO: 0.05 K/UL
BASOPHILS NFR BLD AUTO: 0.8 %
BUN SERPL-MCNC: 22 MG/DL
CALCIUM SERPL-MCNC: 9.2 MG/DL
CALCIUM SERPL-MCNC: 9.2 MG/DL
CHLORIDE SERPL-SCNC: 104 MMOL/L
CO2 SERPL-SCNC: 17 MMOL/L
CREAT SERPL-MCNC: 0.88 MG/DL
EGFR: 65 ML/MIN/1.73M2
EOSINOPHIL # BLD AUTO: 0.14 K/UL
EOSINOPHIL NFR BLD AUTO: 2.3 %
GLUCOSE SERPL-MCNC: 97 MG/DL
HCT VFR BLD CALC: 39.7 %
HGB BLD-MCNC: 11.7 G/DL
IMM GRANULOCYTES NFR BLD AUTO: 0.2 %
LYMPHOCYTES # BLD AUTO: 1.87 K/UL
LYMPHOCYTES NFR BLD AUTO: 31.3 %
MAN DIFF?: NORMAL
MCHC RBC-ENTMCNC: 20.9 PG
MCHC RBC-ENTMCNC: 29.5 GM/DL
MCV RBC AUTO: 71 FL
MONOCYTES # BLD AUTO: 0.44 K/UL
MONOCYTES NFR BLD AUTO: 7.4 %
NEUTROPHILS # BLD AUTO: 3.47 K/UL
NEUTROPHILS NFR BLD AUTO: 58 %
PARATHYROID HORMONE INTACT: 35 PG/ML
PHOSPHATE SERPL-MCNC: 3 MG/DL
PLATELET # BLD AUTO: 215 K/UL
POTASSIUM SERPL-SCNC: 4.4 MMOL/L
RBC # BLD: 5.59 M/UL
RBC # FLD: 19.1 %
SODIUM SERPL-SCNC: 141 MMOL/L
WBC # FLD AUTO: 5.98 K/UL

## 2024-09-17 LAB
APPEARANCE: CLEAR
BACTERIA: NEGATIVE /HPF
BILIRUBIN URINE: NEGATIVE
BLOOD URINE: ABNORMAL
CAST: 1 /LPF
COLOR: YELLOW
CREAT SPEC-SCNC: 65 MG/DL
CREAT/PROT UR: 0.1 RATIO
EPITHELIAL CELLS: 1 /HPF
GLUCOSE QUALITATIVE U: NEGATIVE MG/DL
KETONES URINE: NEGATIVE MG/DL
LEUKOCYTE ESTERASE URINE: ABNORMAL
MICROSCOPIC-UA: NORMAL
NITRITE URINE: NEGATIVE
PH URINE: 5.5
PROT UR-MCNC: 7 MG/DL
PROTEIN URINE: NEGATIVE MG/DL
RED BLOOD CELLS URINE: 2 /HPF
SPECIFIC GRAVITY URINE: 1.01
UROBILINOGEN URINE: 0.2 MG/DL
WHITE BLOOD CELLS URINE: 5 /HPF

## 2024-10-02 ENCOUNTER — APPOINTMENT (OUTPATIENT)
Dept: ULTRASOUND IMAGING | Facility: HOSPITAL | Age: 84
End: 2024-10-02
Payer: MEDICARE

## 2024-10-02 PROCEDURE — 76770 US EXAM ABDO BACK WALL COMP: CPT | Mod: 26

## 2024-10-15 ENCOUNTER — APPOINTMENT (OUTPATIENT)
Dept: OTOLARYNGOLOGY | Facility: CLINIC | Age: 84
End: 2024-10-15
Payer: MEDICARE

## 2024-10-15 VITALS — SYSTOLIC BLOOD PRESSURE: 125 MMHG | DIASTOLIC BLOOD PRESSURE: 70 MMHG | HEART RATE: 70 BPM | TEMPERATURE: 98 F

## 2024-10-15 PROCEDURE — 92557 COMPREHENSIVE HEARING TEST: CPT

## 2024-10-15 PROCEDURE — 69801 INCISE INNER EAR: CPT | Mod: LT

## 2024-10-15 PROCEDURE — 92567 TYMPANOMETRY: CPT

## 2024-10-15 PROCEDURE — 99213 OFFICE O/P EST LOW 20 MIN: CPT | Mod: 25

## 2024-10-22 ENCOUNTER — APPOINTMENT (OUTPATIENT)
Dept: OTOLARYNGOLOGY | Facility: CLINIC | Age: 84
End: 2024-10-22
Payer: MEDICARE

## 2024-10-22 VITALS
WEIGHT: 132 LBS | HEART RATE: 63 BPM | DIASTOLIC BLOOD PRESSURE: 69 MMHG | BODY MASS INDEX: 25.91 KG/M2 | SYSTOLIC BLOOD PRESSURE: 129 MMHG | HEIGHT: 60 IN

## 2024-10-22 PROCEDURE — 99213 OFFICE O/P EST LOW 20 MIN: CPT

## 2024-10-23 ENCOUNTER — APPOINTMENT (OUTPATIENT)
Dept: UROLOGY | Facility: CLINIC | Age: 84
End: 2024-10-23

## 2024-10-28 ENCOUNTER — APPOINTMENT (OUTPATIENT)
Dept: OTOLARYNGOLOGY | Facility: CLINIC | Age: 84
End: 2024-10-28
Payer: MEDICARE

## 2024-10-28 VITALS
SYSTOLIC BLOOD PRESSURE: 141 MMHG | BODY MASS INDEX: 25.52 KG/M2 | HEIGHT: 60 IN | WEIGHT: 130 LBS | HEART RATE: 57 BPM | DIASTOLIC BLOOD PRESSURE: 71 MMHG

## 2024-10-28 VITALS — DIASTOLIC BLOOD PRESSURE: 81 MMHG | HEART RATE: 56 BPM | SYSTOLIC BLOOD PRESSURE: 147 MMHG

## 2024-10-28 DIAGNOSIS — H91.22 SUDDEN IDIOPATHIC HEARING LOSS, LEFT EAR: ICD-10-CM

## 2024-10-28 DIAGNOSIS — H93.8X3 OTHER SPECIFIED DISORDERS OF EAR, BILATERAL: ICD-10-CM

## 2024-10-28 PROCEDURE — 92504 EAR MICROSCOPY EXAMINATION: CPT

## 2024-10-28 PROCEDURE — 92557 COMPREHENSIVE HEARING TEST: CPT

## 2024-10-28 PROCEDURE — 92567 TYMPANOMETRY: CPT

## 2024-10-28 PROCEDURE — 99214 OFFICE O/P EST MOD 30 MIN: CPT | Mod: 25

## 2024-11-02 ENCOUNTER — APPOINTMENT (OUTPATIENT)
Dept: MRI IMAGING | Facility: HOSPITAL | Age: 84
End: 2024-11-02

## 2024-11-02 ENCOUNTER — OUTPATIENT (OUTPATIENT)
Dept: OUTPATIENT SERVICES | Facility: HOSPITAL | Age: 84
LOS: 1 days | End: 2024-11-02
Payer: MEDICARE

## 2024-11-02 DIAGNOSIS — K08.409 PARTIAL LOSS OF TEETH, UNSPECIFIED CAUSE, UNSPECIFIED CLASS: Chronic | ICD-10-CM

## 2024-11-02 DIAGNOSIS — Z01.10 ENCOUNTER FOR EXAMINATION OF EARS AND HEARING WITHOUT ABNORMAL FINDINGS: ICD-10-CM

## 2024-11-02 PROCEDURE — 70551 MRI BRAIN STEM W/O DYE: CPT

## 2024-11-02 PROCEDURE — 70551 MRI BRAIN STEM W/O DYE: CPT | Mod: 26

## 2024-11-05 ENCOUNTER — APPOINTMENT (OUTPATIENT)
Dept: OTOLARYNGOLOGY | Facility: CLINIC | Age: 84
End: 2024-11-05

## 2024-11-11 ENCOUNTER — APPOINTMENT (OUTPATIENT)
Dept: OTOLARYNGOLOGY | Facility: CLINIC | Age: 84
End: 2024-11-11
Payer: MEDICARE

## 2024-11-11 VITALS
SYSTOLIC BLOOD PRESSURE: 136 MMHG | WEIGHT: 128 LBS | DIASTOLIC BLOOD PRESSURE: 65 MMHG | HEIGHT: 61 IN | HEART RATE: 61 BPM | BODY MASS INDEX: 24.17 KG/M2

## 2024-11-11 DIAGNOSIS — H93.8X3 OTHER SPECIFIED DISORDERS OF EAR, BILATERAL: ICD-10-CM

## 2024-11-11 DIAGNOSIS — H91.22 SUDDEN IDIOPATHIC HEARING LOSS, LEFT EAR: ICD-10-CM

## 2024-11-11 PROCEDURE — 92504 EAR MICROSCOPY EXAMINATION: CPT

## 2024-11-11 PROCEDURE — 92557 COMPREHENSIVE HEARING TEST: CPT

## 2024-11-11 PROCEDURE — 99213 OFFICE O/P EST LOW 20 MIN: CPT | Mod: 25

## 2024-11-11 PROCEDURE — 92567 TYMPANOMETRY: CPT

## 2024-12-04 ENCOUNTER — APPOINTMENT (OUTPATIENT)
Dept: OTOLARYNGOLOGY | Facility: CLINIC | Age: 84
End: 2024-12-04
Payer: MEDICARE

## 2024-12-04 VITALS — TEMPERATURE: 98 F | SYSTOLIC BLOOD PRESSURE: 138 MMHG | DIASTOLIC BLOOD PRESSURE: 71 MMHG | HEART RATE: 73 BPM

## 2024-12-04 DIAGNOSIS — H91.22 SUDDEN IDIOPATHIC HEARING LOSS, LEFT EAR: ICD-10-CM

## 2024-12-04 DIAGNOSIS — H81.02 MENIERE'S DISEASE, LEFT EAR: ICD-10-CM

## 2024-12-04 PROCEDURE — 92557 COMPREHENSIVE HEARING TEST: CPT

## 2024-12-04 PROCEDURE — 99213 OFFICE O/P EST LOW 20 MIN: CPT

## 2024-12-04 PROCEDURE — 92567 TYMPANOMETRY: CPT

## 2025-01-14 ENCOUNTER — APPOINTMENT (OUTPATIENT)
Dept: OTOLARYNGOLOGY | Facility: CLINIC | Age: 85
End: 2025-01-14
Payer: MEDICARE

## 2025-01-14 VITALS
DIASTOLIC BLOOD PRESSURE: 70 MMHG | HEIGHT: 61 IN | SYSTOLIC BLOOD PRESSURE: 147 MMHG | WEIGHT: 128 LBS | HEART RATE: 64 BPM | BODY MASS INDEX: 24.17 KG/M2

## 2025-01-14 DIAGNOSIS — H91.22 SUDDEN IDIOPATHIC HEARING LOSS, LEFT EAR: ICD-10-CM

## 2025-01-14 DIAGNOSIS — H93.8X3 OTHER SPECIFIED DISORDERS OF EAR, BILATERAL: ICD-10-CM

## 2025-01-14 PROCEDURE — 92567 TYMPANOMETRY: CPT

## 2025-01-14 PROCEDURE — 99213 OFFICE O/P EST LOW 20 MIN: CPT

## 2025-01-14 PROCEDURE — 92557 COMPREHENSIVE HEARING TEST: CPT

## 2025-02-24 ENCOUNTER — APPOINTMENT (OUTPATIENT)
Dept: ENDOCRINOLOGY | Facility: CLINIC | Age: 85
End: 2025-02-24
Payer: MEDICARE

## 2025-02-24 VITALS
RESPIRATION RATE: 16 BRPM | BODY MASS INDEX: 24.55 KG/M2 | HEIGHT: 61 IN | DIASTOLIC BLOOD PRESSURE: 68 MMHG | WEIGHT: 130 LBS | OXYGEN SATURATION: 99 % | HEART RATE: 68 BPM | TEMPERATURE: 97.7 F | SYSTOLIC BLOOD PRESSURE: 130 MMHG

## 2025-02-24 DIAGNOSIS — E03.9 HYPOTHYROIDISM, UNSPECIFIED: ICD-10-CM

## 2025-02-24 DIAGNOSIS — C73 MALIGNANT NEOPLASM OF THYROID GLAND: ICD-10-CM

## 2025-02-24 DIAGNOSIS — E06.3 AUTOIMMUNE THYROIDITIS: ICD-10-CM

## 2025-02-24 DIAGNOSIS — M81.0 AGE-RELATED OSTEOPOROSIS W/OUT CURRENT PATHOLOGICAL FRACTURE: ICD-10-CM

## 2025-02-24 PROCEDURE — 99215 OFFICE O/P EST HI 40 MIN: CPT

## 2025-02-25 ENCOUNTER — APPOINTMENT (OUTPATIENT)
Dept: OTOLARYNGOLOGY | Facility: CLINIC | Age: 85
End: 2025-02-25
Payer: MEDICARE

## 2025-02-25 VITALS
BODY MASS INDEX: 24.55 KG/M2 | WEIGHT: 130 LBS | TEMPERATURE: 97.1 F | SYSTOLIC BLOOD PRESSURE: 129 MMHG | DIASTOLIC BLOOD PRESSURE: 69 MMHG | HEART RATE: 75 BPM | HEIGHT: 61 IN

## 2025-02-25 DIAGNOSIS — H91.22 SUDDEN IDIOPATHIC HEARING LOSS, LEFT EAR: ICD-10-CM

## 2025-02-25 DIAGNOSIS — H61.22 IMPACTED CERUMEN, LEFT EAR: ICD-10-CM

## 2025-02-25 LAB
25(OH)D3 SERPL-MCNC: 59.8 NG/ML
ALBUMIN SERPL ELPH-MCNC: 4.2 G/DL
ALP BLD-CCNC: 111 U/L
ALT SERPL-CCNC: 6 U/L
ANION GAP SERPL CALC-SCNC: 13 MMOL/L
AST SERPL-CCNC: 20 U/L
BILIRUB SERPL-MCNC: 0.5 MG/DL
BUN SERPL-MCNC: 18 MG/DL
CALCIUM SERPL-MCNC: 9 MG/DL
CHLORIDE SERPL-SCNC: 102 MMOL/L
CO2 SERPL-SCNC: 26 MMOL/L
CREAT SERPL-MCNC: 0.95 MG/DL
EGFR: 59 ML/MIN/1.73M2
ESTIMATED AVERAGE GLUCOSE: 126 MG/DL
GLUCOSE SERPL-MCNC: 163 MG/DL
HBA1C MFR BLD HPLC: 6 %
HCT VFR BLD CALC: 43.5 %
HGB BLD-MCNC: 12.5 G/DL
MCHC RBC-ENTMCNC: 20.8 PG
MCHC RBC-ENTMCNC: 28.7 G/DL
MCV RBC AUTO: 72.3 FL
PLATELET # BLD AUTO: 229 K/UL
POTASSIUM SERPL-SCNC: 4.3 MMOL/L
PROT SERPL-MCNC: 6.9 G/DL
RBC # BLD: 6.02 M/UL
RBC # FLD: 19.2 %
SODIUM SERPL-SCNC: 141 MMOL/L
THYROGLOB AB SERPL-ACNC: 35.3 IU/ML
THYROGLOB SERPL-MCNC: <0.1 NG/ML
TSH SERPL-ACNC: 0.2 UIU/ML
VIT B12 SERPL-MCNC: 422 PG/ML
WBC # FLD AUTO: 6.72 K/UL

## 2025-02-25 PROCEDURE — 99213 OFFICE O/P EST LOW 20 MIN: CPT

## 2025-02-25 PROCEDURE — 92567 TYMPANOMETRY: CPT

## 2025-02-25 PROCEDURE — 92557 COMPREHENSIVE HEARING TEST: CPT

## 2025-03-09 LAB
CLINICAL BIOCHEMIST REVIEW: NORMAL
THYROGLOB SERPL-MCNC: NORMAL NG/ML

## 2025-03-17 ENCOUNTER — APPOINTMENT (OUTPATIENT)
Dept: ULTRASOUND IMAGING | Facility: HOSPITAL | Age: 85
End: 2025-03-17
Payer: MEDICARE

## 2025-03-17 ENCOUNTER — OUTPATIENT (OUTPATIENT)
Dept: OUTPATIENT SERVICES | Facility: HOSPITAL | Age: 85
LOS: 1 days | End: 2025-03-17
Payer: MEDICARE

## 2025-03-17 DIAGNOSIS — C73 MALIGNANT NEOPLASM OF THYROID GLAND: ICD-10-CM

## 2025-03-17 DIAGNOSIS — K08.409 PARTIAL LOSS OF TEETH, UNSPECIFIED CAUSE, UNSPECIFIED CLASS: Chronic | ICD-10-CM

## 2025-03-17 PROCEDURE — 76536 US EXAM OF HEAD AND NECK: CPT

## 2025-03-17 PROCEDURE — 76536 US EXAM OF HEAD AND NECK: CPT | Mod: 26

## 2025-03-20 ENCOUNTER — APPOINTMENT (OUTPATIENT)
Dept: ENDOCRINOLOGY | Facility: CLINIC | Age: 85
End: 2025-03-20
Payer: MEDICARE

## 2025-03-20 VITALS
DIASTOLIC BLOOD PRESSURE: 72 MMHG | SYSTOLIC BLOOD PRESSURE: 126 MMHG | HEART RATE: 78 BPM | BODY MASS INDEX: 24.35 KG/M2 | WEIGHT: 129 LBS | TEMPERATURE: 97.5 F | RESPIRATION RATE: 16 BRPM | OXYGEN SATURATION: 98 % | HEIGHT: 61 IN

## 2025-03-20 DIAGNOSIS — E06.3 AUTOIMMUNE THYROIDITIS: ICD-10-CM

## 2025-03-20 DIAGNOSIS — M81.0 AGE-RELATED OSTEOPOROSIS W/OUT CURRENT PATHOLOGICAL FRACTURE: ICD-10-CM

## 2025-03-20 DIAGNOSIS — E03.9 HYPOTHYROIDISM, UNSPECIFIED: ICD-10-CM

## 2025-03-20 DIAGNOSIS — C73 MALIGNANT NEOPLASM OF THYROID GLAND: ICD-10-CM

## 2025-03-20 DIAGNOSIS — R73.03 PREDIABETES.: ICD-10-CM

## 2025-03-20 PROCEDURE — 99215 OFFICE O/P EST HI 40 MIN: CPT

## 2025-03-20 PROCEDURE — G2211 COMPLEX E/M VISIT ADD ON: CPT

## 2025-03-25 ENCOUNTER — NON-APPOINTMENT (OUTPATIENT)
Age: 85
End: 2025-03-25

## 2025-04-22 ENCOUNTER — APPOINTMENT (OUTPATIENT)
Dept: OTOLARYNGOLOGY | Facility: CLINIC | Age: 85
End: 2025-04-22
Payer: MEDICARE

## 2025-04-22 VITALS — DIASTOLIC BLOOD PRESSURE: 66 MMHG | SYSTOLIC BLOOD PRESSURE: 111 MMHG | HEART RATE: 68 BPM

## 2025-04-22 DIAGNOSIS — Z01.10 ENCOUNTER FOR EXAMINATION OF EARS AND HEARING W/OUT ABNORMAL FINDINGS: ICD-10-CM

## 2025-04-22 PROCEDURE — 99213 OFFICE O/P EST LOW 20 MIN: CPT

## 2025-04-22 RX ORDER — OFLOXACIN OTIC 3 MG/ML
0.3 SOLUTION AURICULAR (OTIC)
Qty: 1 | Refills: 0 | Status: ACTIVE | COMMUNITY
Start: 2025-04-22 | End: 1900-01-01

## 2025-04-30 ENCOUNTER — APPOINTMENT (OUTPATIENT)
Dept: OTOLARYNGOLOGY | Facility: CLINIC | Age: 85
End: 2025-04-30
Payer: MEDICARE

## 2025-04-30 VITALS — SYSTOLIC BLOOD PRESSURE: 113 MMHG | HEART RATE: 75 BPM | DIASTOLIC BLOOD PRESSURE: 66 MMHG | TEMPERATURE: 98 F

## 2025-04-30 DIAGNOSIS — H61.22 IMPACTED CERUMEN, LEFT EAR: ICD-10-CM

## 2025-04-30 DIAGNOSIS — H81.02 MENIERE'S DISEASE, LEFT EAR: ICD-10-CM

## 2025-04-30 PROCEDURE — 92567 TYMPANOMETRY: CPT

## 2025-04-30 PROCEDURE — G0268 REMOVAL OF IMPACTED WAX MD: CPT

## 2025-04-30 PROCEDURE — 92557 COMPREHENSIVE HEARING TEST: CPT

## 2025-04-30 PROCEDURE — 99213 OFFICE O/P EST LOW 20 MIN: CPT | Mod: 25

## 2025-05-28 ENCOUNTER — APPOINTMENT (OUTPATIENT)
Dept: OTOLARYNGOLOGY | Facility: CLINIC | Age: 85
End: 2025-05-28
Payer: MEDICARE

## 2025-05-28 VITALS
HEART RATE: 73 BPM | BODY MASS INDEX: 24.35 KG/M2 | WEIGHT: 129 LBS | HEIGHT: 61 IN | DIASTOLIC BLOOD PRESSURE: 50 MMHG | SYSTOLIC BLOOD PRESSURE: 120 MMHG

## 2025-05-28 DIAGNOSIS — Z01.10 ENCOUNTER FOR EXAMINATION OF EARS AND HEARING W/OUT ABNORMAL FINDINGS: ICD-10-CM

## 2025-05-28 PROCEDURE — 92567 TYMPANOMETRY: CPT

## 2025-05-28 PROCEDURE — 99213 OFFICE O/P EST LOW 20 MIN: CPT

## 2025-05-28 PROCEDURE — 92557 COMPREHENSIVE HEARING TEST: CPT

## 2025-05-28 RX ORDER — AZELASTINE 137 UG/1
137 SPRAY, METERED NASAL
Qty: 1 | Refills: 5 | Status: ACTIVE | COMMUNITY
Start: 2025-05-28 | End: 1900-01-01

## 2025-07-01 ENCOUNTER — APPOINTMENT (OUTPATIENT)
Dept: OTOLARYNGOLOGY | Facility: CLINIC | Age: 85
End: 2025-07-01
Payer: MEDICARE

## 2025-07-01 PROCEDURE — 99213 OFFICE O/P EST LOW 20 MIN: CPT

## 2025-07-01 PROCEDURE — 92557 COMPREHENSIVE HEARING TEST: CPT

## 2025-07-01 PROCEDURE — 92567 TYMPANOMETRY: CPT

## 2025-07-17 ENCOUNTER — APPOINTMENT (OUTPATIENT)
Dept: ORTHOPEDIC SURGERY | Facility: CLINIC | Age: 85
End: 2025-07-17
Payer: MEDICARE

## 2025-07-17 PROCEDURE — 99214 OFFICE O/P EST MOD 30 MIN: CPT

## 2025-07-17 RX ORDER — HYLAN G-F 20 16MG/2ML
16 SYRINGE (ML) INTRAARTICULAR
Qty: 2 | Refills: 0 | Status: ACTIVE | COMMUNITY
Start: 2025-07-17

## 2025-09-04 ENCOUNTER — APPOINTMENT (OUTPATIENT)
Dept: ORTHOPEDIC SURGERY | Facility: CLINIC | Age: 85
End: 2025-09-04
Payer: MEDICARE

## 2025-09-04 DIAGNOSIS — M17.12 UNILATERAL PRIMARY OSTEOARTHRITIS, LEFT KNEE: ICD-10-CM

## 2025-09-04 DIAGNOSIS — M17.11 UNILATERAL PRIMARY OSTEOARTHRITIS, RIGHT KNEE: ICD-10-CM

## 2025-09-04 PROCEDURE — 20610 DRAIN/INJ JOINT/BURSA W/O US: CPT | Mod: 50

## 2025-09-04 PROCEDURE — 99213 OFFICE O/P EST LOW 20 MIN: CPT | Mod: 25

## 2025-09-18 ENCOUNTER — APPOINTMENT (OUTPATIENT)
Dept: ENDOCRINOLOGY | Facility: CLINIC | Age: 85
End: 2025-09-18
Payer: MEDICARE

## 2025-09-18 VITALS
HEIGHT: 61 IN | RESPIRATION RATE: 16 BRPM | BODY MASS INDEX: 24.35 KG/M2 | WEIGHT: 129 LBS | HEART RATE: 73 BPM | SYSTOLIC BLOOD PRESSURE: 116 MMHG | OXYGEN SATURATION: 98 % | TEMPERATURE: 97.3 F | DIASTOLIC BLOOD PRESSURE: 64 MMHG

## 2025-09-18 PROCEDURE — 99214 OFFICE O/P EST MOD 30 MIN: CPT

## 2025-09-19 ENCOUNTER — APPOINTMENT (OUTPATIENT)
Dept: ENDOCRINOLOGY | Facility: CLINIC | Age: 85
End: 2025-09-19
Payer: MEDICARE

## 2025-09-19 DIAGNOSIS — R73.03 PREDIABETES.: ICD-10-CM

## 2025-09-19 DIAGNOSIS — C73 MALIGNANT NEOPLASM OF THYROID GLAND: ICD-10-CM

## 2025-09-19 DIAGNOSIS — M81.0 AGE-RELATED OSTEOPOROSIS W/OUT CURRENT PATHOLOGICAL FRACTURE: ICD-10-CM

## 2025-09-19 DIAGNOSIS — E03.9 HYPOTHYROIDISM, UNSPECIFIED: ICD-10-CM

## 2025-09-19 DIAGNOSIS — E06.3 AUTOIMMUNE THYROIDITIS: ICD-10-CM

## 2025-09-19 LAB
25(OH)D3 SERPL-MCNC: 38.6 NG/ML
ALBUMIN SERPL ELPH-MCNC: 3.5 G/DL
ALP BLD-CCNC: 84 U/L
ALT SERPL-CCNC: 7 U/L
ANION GAP SERPL CALC-SCNC: 15 MMOL/L
AST SERPL-CCNC: 21 U/L
BASOPHILS # BLD AUTO: 0.04 K/UL
BASOPHILS NFR BLD AUTO: 0.7 %
BILIRUB SERPL-MCNC: 0.6 MG/DL
BUN SERPL-MCNC: 21 MG/DL
CALCIUM SERPL-MCNC: 8.8 MG/DL
CHLORIDE SERPL-SCNC: 104 MMOL/L
CO2 SERPL-SCNC: 21 MMOL/L
CREAT SERPL-MCNC: 0.99 MG/DL
EGFRCR SERPLBLD CKD-EPI 2021: 56 ML/MIN/1.73M2
EOSINOPHIL # BLD AUTO: 0.14 K/UL
EOSINOPHIL NFR BLD AUTO: 2.5 %
ESTIMATED AVERAGE GLUCOSE: 131 MG/DL
GLUCOSE SERPL-MCNC: 215 MG/DL
HBA1C MFR BLD HPLC: 6.2 %
HCT VFR BLD CALC: 36.1 %
HGB BLD-MCNC: 10.7 G/DL
IMM GRANULOCYTES NFR BLD AUTO: 0 %
LYMPHOCYTES # BLD AUTO: 1.63 K/UL
LYMPHOCYTES NFR BLD AUTO: 29 %
MAN DIFF?: NORMAL
MCHC RBC-ENTMCNC: 21 PG
MCHC RBC-ENTMCNC: 29.6 G/DL
MCV RBC AUTO: 70.9 FL
MONOCYTES # BLD AUTO: 0.34 K/UL
MONOCYTES NFR BLD AUTO: 6 %
NEUTROPHILS # BLD AUTO: 3.47 K/UL
NEUTROPHILS NFR BLD AUTO: 61.8 %
PLATELET # BLD AUTO: 210 K/UL
POTASSIUM SERPL-SCNC: 3.7 MMOL/L
PROT SERPL-MCNC: 6.4 G/DL
RBC # BLD: 5.09 M/UL
RBC # FLD: 18.8 %
SODIUM SERPL-SCNC: 140 MMOL/L
TSH SERPL-ACNC: 0.41 UIU/ML
WBC # FLD AUTO: 5.62 K/UL

## 2025-09-19 PROCEDURE — 99214 OFFICE O/P EST MOD 30 MIN: CPT | Mod: 2W
